# Patient Record
Sex: FEMALE | Race: WHITE | Employment: STUDENT | URBAN - METROPOLITAN AREA
[De-identification: names, ages, dates, MRNs, and addresses within clinical notes are randomized per-mention and may not be internally consistent; named-entity substitution may affect disease eponyms.]

---

## 2023-07-04 ENCOUNTER — OFFICE VISIT (OUTPATIENT)
Dept: FAMILY MEDICINE CLINIC | Facility: CLINIC | Age: 23
End: 2023-07-04
Payer: COMMERCIAL

## 2023-07-04 VITALS
TEMPERATURE: 97 F | SYSTOLIC BLOOD PRESSURE: 106 MMHG | OXYGEN SATURATION: 99 % | WEIGHT: 110 LBS | RESPIRATION RATE: 18 BRPM | BODY MASS INDEX: 18.33 KG/M2 | HEART RATE: 77 BPM | HEIGHT: 65 IN | DIASTOLIC BLOOD PRESSURE: 72 MMHG

## 2023-07-04 DIAGNOSIS — H10.9 BACTERIAL CONJUNCTIVITIS OF LEFT EYE: Primary | ICD-10-CM

## 2023-07-04 PROCEDURE — 3078F DIAST BP <80 MM HG: CPT | Performed by: NURSE PRACTITIONER

## 2023-07-04 PROCEDURE — 99203 OFFICE O/P NEW LOW 30 MIN: CPT | Performed by: NURSE PRACTITIONER

## 2023-07-04 PROCEDURE — 3074F SYST BP LT 130 MM HG: CPT | Performed by: NURSE PRACTITIONER

## 2023-07-04 PROCEDURE — 3008F BODY MASS INDEX DOCD: CPT | Performed by: NURSE PRACTITIONER

## 2023-07-04 RX ORDER — GENTAMICIN SULFATE 3 MG/ML
1 SOLUTION/ DROPS OPHTHALMIC 4 TIMES DAILY
Qty: 1 EACH | Refills: 0 | Status: SHIPPED | OUTPATIENT
Start: 2023-07-04 | End: 2023-07-11

## 2023-07-06 ENCOUNTER — OFFICE VISIT (OUTPATIENT)
Dept: FAMILY MEDICINE CLINIC | Facility: CLINIC | Age: 23
End: 2023-07-06
Payer: COMMERCIAL

## 2023-07-06 VITALS
TEMPERATURE: 99 F | HEART RATE: 91 BPM | OXYGEN SATURATION: 97 % | BODY MASS INDEX: 18.13 KG/M2 | RESPIRATION RATE: 16 BRPM | WEIGHT: 108.81 LBS | HEIGHT: 65 IN | SYSTOLIC BLOOD PRESSURE: 111 MMHG | DIASTOLIC BLOOD PRESSURE: 70 MMHG

## 2023-07-06 DIAGNOSIS — J06.9 VIRAL UPPER RESPIRATORY TRACT INFECTION: ICD-10-CM

## 2023-07-06 DIAGNOSIS — J02.9 SORE THROAT: Primary | ICD-10-CM

## 2023-07-06 LAB
CONTROL LINE PRESENT WITH A CLEAR BACKGROUND (YES/NO): YES YES/NO
KIT LOT #: NORMAL NUMERIC
STREP GRP A CUL-SCR: NEGATIVE

## 2023-07-06 PROCEDURE — 3008F BODY MASS INDEX DOCD: CPT | Performed by: NURSE PRACTITIONER

## 2023-07-06 PROCEDURE — 3078F DIAST BP <80 MM HG: CPT | Performed by: NURSE PRACTITIONER

## 2023-07-06 PROCEDURE — 3074F SYST BP LT 130 MM HG: CPT | Performed by: NURSE PRACTITIONER

## 2023-07-06 PROCEDURE — 87880 STREP A ASSAY W/OPTIC: CPT | Performed by: NURSE PRACTITIONER

## 2023-07-06 PROCEDURE — 99213 OFFICE O/P EST LOW 20 MIN: CPT | Performed by: NURSE PRACTITIONER

## 2023-07-06 PROCEDURE — 87081 CULTURE SCREEN ONLY: CPT | Performed by: NURSE PRACTITIONER

## 2024-04-07 ENCOUNTER — OFFICE VISIT (OUTPATIENT)
Dept: FAMILY MEDICINE CLINIC | Facility: CLINIC | Age: 24
End: 2024-04-07
Payer: COMMERCIAL

## 2024-04-07 VITALS
HEIGHT: 65 IN | RESPIRATION RATE: 16 BRPM | BODY MASS INDEX: 17.49 KG/M2 | TEMPERATURE: 99 F | OXYGEN SATURATION: 99 % | SYSTOLIC BLOOD PRESSURE: 110 MMHG | HEART RATE: 83 BPM | DIASTOLIC BLOOD PRESSURE: 72 MMHG | WEIGHT: 105 LBS

## 2024-04-07 DIAGNOSIS — H10.9 CONJUNCTIVITIS OF LEFT EYE, UNSPECIFIED CONJUNCTIVITIS TYPE: Primary | ICD-10-CM

## 2024-04-07 DIAGNOSIS — J06.9 UPPER RESPIRATORY TRACT INFECTION, UNSPECIFIED TYPE: ICD-10-CM

## 2024-04-07 PROCEDURE — 3078F DIAST BP <80 MM HG: CPT | Performed by: NURSE PRACTITIONER

## 2024-04-07 PROCEDURE — 3074F SYST BP LT 130 MM HG: CPT | Performed by: NURSE PRACTITIONER

## 2024-04-07 PROCEDURE — 99213 OFFICE O/P EST LOW 20 MIN: CPT | Performed by: NURSE PRACTITIONER

## 2024-04-07 PROCEDURE — 3008F BODY MASS INDEX DOCD: CPT | Performed by: NURSE PRACTITIONER

## 2024-04-07 RX ORDER — OFLOXACIN 3 MG/ML
2 SOLUTION/ DROPS OPHTHALMIC 4 TIMES DAILY
Qty: 1 EACH | Refills: 0 | Status: SHIPPED | OUTPATIENT
Start: 2024-04-07 | End: 2024-04-14

## 2024-04-07 RX ORDER — OFLOXACIN 3 MG/ML
2 SOLUTION/ DROPS OPHTHALMIC 4 TIMES DAILY
Qty: 1 EACH | Refills: 0 | Status: SHIPPED | OUTPATIENT
Start: 2024-04-07 | End: 2024-04-07

## 2024-04-07 NOTE — PROGRESS NOTES
CHIEF COMPLAINT:     Chief Complaint   Patient presents with    Eye Problem     Red gooey       HPI:   Ruba Saleh is a 23 year old female who presents for upper respiratory symptoms for  2 days. Patient reports congestion, dry cough, left eye red and draining . Symptoms have been worsening since onset.  Treating symptoms with dayquil/nyquil. 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      Current Outpatient Medications   Medication Sig Dispense Refill    ofloxacin 0.3 % Ophthalmic Solution Place 2 drops into the left eye 4 (four) times daily for 7 days. 1 each 0    Levonorgestrel-Ethinyl Estrad (ALTAVERA) 0.15-30 MG-MCG Oral Tab TAKE 1 TABLET BY MOUTH EVERY DAY CONTINUOUSLY 112 tablet 3      Past Medical History:   Diagnosis Date    Allergic rhinitis, seasonal 5/8/2014      History reviewed. No pertinent surgical history.      Social History     Socioeconomic History    Marital status: Single   Tobacco Use    Smoking status: Never    Smokeless tobacco: Never   Vaping Use    Vaping Use: Never used   Substance and Sexual Activity    Alcohol use: Yes     Comment: soc    Drug use: No    Sexual activity: Yes     Partners: Male     Birth control/protection: Condom, Pill, OCP         REVIEW OF SYSTEMS:   GENERAL: normal appetite  SKIN: no rashes or abnormal skin lesions  HEENT: See HPI  LUNGS: See HPI  CARDIOVASCULAR: denies chest pain or palpitations   GI: denies N/V/C or abdominal pain      EXAM:   /72   Pulse 83   Temp 98.6 °F (37 °C)   Resp 16   Ht 5' 5\" (1.651 m)   Wt 105 lb (47.6 kg)   LMP 05/04/2023 (Approximate)   SpO2 99%   BMI 17.47 kg/m²   GENERAL: well developed, well nourished,in no apparent distress  SKIN: no rashes,no suspicious lesions  HEAD: atraumatic, normocephalic.  no tenderness on palpation of  sinuses  EYES: conjunctiva clear on right, injected with yellow/white crusted drainage, EOM intact  EARS: TM's pearly, no bulging, no retraction,no fluid, bony landmarks visible  NOSE: Nostrils patent,  clear nasal discharge, nasal mucosa red and inflamed   THROAT: Oral mucosa pink, moist. Posterior pharynx is not erythematous. no exudates. Tonsils 1/4.    NECK: Supple, non-tender  LUNGS: clear to auscultation bilaterally, no wheezes or rhonchi. Breathing is non labored.  CARDIO: RRR without murmur  EXTREMITIES: no cyanosis, clubbing or edema  LYMPH:  pos anterior cervical lymphadenopathy.        ASSESSMENT AND PLAN:   Ruba Saleh is a 23 year old female who presents with upper respiratory symptoms that are consistent with    ASSESSMENT:   Encounter Diagnoses   Name Primary?    Conjunctivitis of left eye, unspecified conjunctivitis type Yes    Upper respiratory tract infection, unspecified type        PLAN: Meds as below.  Comfort care as described in Patient Instructions  Educated on medication for eye  Educated on viral vs bacterial  Educated on supportive measures: ibuprofen/tylenol, hydration, zyrtec, flonase, delsym for cough if needed  Educated on s/s of worsening sx and when to seek higher level of care  Follow up with pcp if not improving    Meds & Refills for this Visit:  Requested Prescriptions     Signed Prescriptions Disp Refills    ofloxacin 0.3 % Ophthalmic Solution 1 each 0     Sig: Place 2 drops into the left eye 4 (four) times daily for 7 days.     Risks, benefits, and side effects of medication explained and discussed.    The patient indicates understanding of these issues and agrees to the plan.  The patient is asked to f/u with PCP if sx's persist or worsen.  There are no Patient Instructions on file for this visit.

## 2024-04-12 ENCOUNTER — OFFICE VISIT (OUTPATIENT)
Dept: FAMILY MEDICINE CLINIC | Facility: CLINIC | Age: 24
End: 2024-04-12
Payer: COMMERCIAL

## 2024-04-12 VITALS
SYSTOLIC BLOOD PRESSURE: 129 MMHG | RESPIRATION RATE: 18 BRPM | WEIGHT: 105 LBS | HEART RATE: 106 BPM | BODY MASS INDEX: 17.49 KG/M2 | TEMPERATURE: 100 F | HEIGHT: 65 IN | OXYGEN SATURATION: 95 % | DIASTOLIC BLOOD PRESSURE: 87 MMHG

## 2024-04-12 DIAGNOSIS — J01.00 ACUTE NON-RECURRENT MAXILLARY SINUSITIS: Primary | ICD-10-CM

## 2024-04-12 PROCEDURE — 99213 OFFICE O/P EST LOW 20 MIN: CPT | Performed by: NURSE PRACTITIONER

## 2024-04-12 PROCEDURE — 3008F BODY MASS INDEX DOCD: CPT | Performed by: NURSE PRACTITIONER

## 2024-04-12 PROCEDURE — 3079F DIAST BP 80-89 MM HG: CPT | Performed by: NURSE PRACTITIONER

## 2024-04-12 PROCEDURE — 3074F SYST BP LT 130 MM HG: CPT | Performed by: NURSE PRACTITIONER

## 2024-04-12 RX ORDER — AMOXICILLIN AND CLAVULANATE POTASSIUM 875; 125 MG/1; MG/1
1 TABLET, FILM COATED ORAL 2 TIMES DAILY
Qty: 14 TABLET | Refills: 0 | Status: SHIPPED | OUTPATIENT
Start: 2024-04-12 | End: 2024-04-19

## 2024-04-13 NOTE — PATIENT INSTRUCTIONS
I recommend the 4 H's for inflammation:    1. Heat (warm mist from the shower or warm liquids such as tea)  2. Honey (mixed in your tea or by the spoonful [if you are not diabetic; over the age of 1 year]--take a spoonful 3 times a day and don't eat or drink anything for 15-20 minutes)  3. Humidity--cool mist in the bedroom at night  4. Hydration --at least 8 -10 glasses a day     Flonase daily  Benadryl at night  Start antibiotic in a few days for worsening symptoms or no improvement.

## 2024-04-13 NOTE — PROGRESS NOTES
CHIEF COMPLAINT:     Chief Complaint   Patient presents with    Sinus Problem     Symptoms started on Sunday : sinus pressure, headache, and congestion  OTC: flonase and dayquill        HPI:   Ruba Saleh is a 23 year old female who presents for cold symptoms for  6  days. Symptoms have progressed into sinus congestion and been worsening since onset. Sinus congestion/pain is described as a pressure and is located mainly in maxillary sinuses.  Patient also reports congestion, sinus pain. reports dental pain. Has treated symptoms with Flonase and Dayquil.       Current Outpatient Medications   Medication Sig Dispense Refill    amoxicillin clavulanate 875-125 MG Oral Tab Take 1 tablet by mouth 2 (two) times daily for 7 days. 14 tablet 0    ofloxacin 0.3 % Ophthalmic Solution Place 2 drops into the left eye 4 (four) times daily for 7 days. 1 each 0    Levonorgestrel-Ethinyl Estrad (ALTAVERA) 0.15-30 MG-MCG Oral Tab TAKE 1 TABLET BY MOUTH EVERY DAY CONTINUOUSLY 112 tablet 3      Past Medical History:    Allergic rhinitis, seasonal      No past surgical history on file.   Family History   Problem Relation Age of Onset    Arthritis Maternal Grandmother     Diabetes Maternal Grandmother     Mental Disorder Maternal Grandmother         bipolar    Heart Disease Maternal Grandfather         atrial fib.    High Blood Pressure Maternal Grandfather     High Cholesterol Maternal Grandfather     High Blood Pressure Paternal Grandmother     High Cholesterol Paternal Grandmother     Cancer Paternal Grandfather         Liver cancer    Other (Other) Brother         kidney stone      Social History     Socioeconomic History    Marital status: Single   Tobacco Use    Smoking status: Never    Smokeless tobacco: Never   Vaping Use    Vaping status: Never Used   Substance and Sexual Activity    Alcohol use: Yes     Comment: soc    Drug use: No    Sexual activity: Yes     Partners: Male     Birth control/protection: Condom, Pill, OCP          REVIEW OF SYSTEMS:   GENERAL:  denies  diminished appetite  SKIN: no rashes or abnormal skin lesions  HEENT: See HPI.    LUNGS: denies shortness of breath or wheezing, See HPI  CARDIOVASCULAR: denies chest pain or palpitations   GI: denies N/V/C or abdominal pain  NEURO: + sinus headaches.  No numbness or tingling in face.    EXAM:   /87   Pulse 106   Temp 99.9 °F (37.7 °C)   Resp 18   Ht 5' 5\" (1.651 m)   Wt 105 lb (47.6 kg)   LMP 02/28/2024 (Approximate)   SpO2 95%   BMI 17.47 kg/m²   GENERAL: well developed, well nourished,in no apparent distress  SKIN: no rashes,no suspicious lesions  HEAD: atraumatic, normocephalic, + tenderness on palpation of maxillary sinuses  EYES: conjunctiva clear, EOM intact  EARS: TM's without erythema, no bulging, no retraction, mild fluid, bony landmarks visible  NOSE: nostrils patent, clear nasal mucous, nasal mucosa reddened and inflamed  THROAT: oral mucosa pink, moist. No visible dental caries. Posterior pharynx is not erythematous.  NECK: supple, non-tender  LUNGS: Breathing is non labored. Lungs clear to auscultation bilaterally, no wheezes or rhonchi.   CARDIO: RRR without murmur  EXTREMITIES: no cyanosis, clubbing or edema  LYMPH:  no cervical lymphadenopathy.        ASSESSMENT AND PLAN:   Ruba Saleh is a 23 year old female who presents with URI symptoms that are consistent with:      ASSESSMENT:  Encounter Diagnosis   Name Primary?    Acute non-recurrent maxillary sinusitis Yes         PLAN: Meds as below.  Patient to start in a few days for new or worsening symptoms.  Comfort care instructions as listed in Patient Instructions    Meds & Refills for this Visit:  Requested Prescriptions     Signed Prescriptions Disp Refills    amoxicillin clavulanate 875-125 MG Oral Tab 14 tablet 0     Sig: Take 1 tablet by mouth 2 (two) times daily for 7 days.       Risks, benefits, side effects of medication addressed and explained.    Patient Instructions   I recommend  the 4 H's for inflammation:    1. Heat (warm mist from the shower or warm liquids such as tea)  2. Honey (mixed in your tea or by the spoonful [if you are not diabetic; over the age of 1 year]--take a spoonful 3 times a day and don't eat or drink anything for 15-20 minutes)  3. Humidity--cool mist in the bedroom at night  4. Hydration --at least 8 -10 glasses a day     Flonase daily  Benadryl at night  Start antibiotic in a few days for worsening symptoms or no improvement.      The patient indicates understanding of these issues and agrees to the plan.  The patient is asked to f/u with PCP if sx's persist or worsen.

## 2024-08-27 ENCOUNTER — OFFICE VISIT (OUTPATIENT)
Dept: FAMILY MEDICINE CLINIC | Facility: CLINIC | Age: 24
End: 2024-08-27
Payer: COMMERCIAL

## 2024-08-27 VITALS
SYSTOLIC BLOOD PRESSURE: 112 MMHG | HEIGHT: 65 IN | HEART RATE: 85 BPM | DIASTOLIC BLOOD PRESSURE: 70 MMHG | TEMPERATURE: 99 F | WEIGHT: 108 LBS | OXYGEN SATURATION: 98 % | BODY MASS INDEX: 17.99 KG/M2 | RESPIRATION RATE: 16 BRPM

## 2024-08-27 DIAGNOSIS — L71.0 PERIORAL DERMATITIS: Primary | ICD-10-CM

## 2024-08-27 PROCEDURE — 3074F SYST BP LT 130 MM HG: CPT | Performed by: NURSE PRACTITIONER

## 2024-08-27 PROCEDURE — 3078F DIAST BP <80 MM HG: CPT | Performed by: NURSE PRACTITIONER

## 2024-08-27 PROCEDURE — 3008F BODY MASS INDEX DOCD: CPT | Performed by: NURSE PRACTITIONER

## 2024-08-27 PROCEDURE — 99213 OFFICE O/P EST LOW 20 MIN: CPT | Performed by: NURSE PRACTITIONER

## 2024-08-27 RX ORDER — ERYTHROMYCIN 20 MG/G
1 GEL TOPICAL 2 TIMES DAILY
Qty: 30 G | Refills: 0 | Status: SHIPPED | OUTPATIENT
Start: 2024-08-27

## 2024-08-27 NOTE — PROGRESS NOTES
CHIEF COMPLAINT:     Chief Complaint   Patient presents with    Rash     2 months, spot on right side of chin, gotten bigger, started as one small bump and slowly expanded  OTC neosporin          HPI:    Ruba Saleh is a 23 year old female accompanied by mother who presents for evaluation of a rash.  Per patient rash started in the past 2 months on right side of chin. Has increased in size since onset. The area is not painful or pruritic. No crusting or drainage. Treated with topical neosporin a few times without improvement.       Current Outpatient Medications   Medication Sig Dispense Refill    Erythromycin 2 % External Gel Apply 1 Application topically 2 (two) times daily. 30 g 0    Levonorgestrel-Ethinyl Estrad (ALTAVERA) 0.15-30 MG-MCG Oral Tab TAKE 1 TABLET BY MOUTH EVERY DAY CONTINUOUSLY 112 tablet 3     No current facility-administered medications for this visit.      Past Medical History:    Allergic rhinitis, seasonal      No past surgical history on file.   Family History   Problem Relation Age of Onset    Arthritis Maternal Grandmother     Diabetes Maternal Grandmother     Mental Disorder Maternal Grandmother         bipolar    Heart Disease Maternal Grandfather         atrial fib.    High Blood Pressure Maternal Grandfather     High Cholesterol Maternal Grandfather     High Blood Pressure Paternal Grandmother     High Cholesterol Paternal Grandmother     Cancer Paternal Grandfather         Liver cancer    Other (Other) Brother         kidney stone      Social History     Socioeconomic History    Marital status: Single   Tobacco Use    Smoking status: Never    Smokeless tobacco: Never   Vaping Use    Vaping status: Never Used   Substance and Sexual Activity    Alcohol use: Yes     Comment: soc    Drug use: No    Sexual activity: Yes     Partners: Male     Birth control/protection: Condom, Pill, OCP         REVIEW OF SYSTEMS:   GENERAL: feels well otherwise, no fever, no chills.  SKIN: Per HPI. No  edema. No ulcerations.  HEENT: Denies rhinorrhea, edema of the lips or swelling of throat.  CARDIOVASCULAR: Denies chest pains or palpitations.  LUNGS: Denies shortness of breath with exertion or rest. No cough or wheezing.  LYMPH: Denies enlargement of the lymph nodes.  NEURO: Denies abnormal sensation, tingling of the skin, or numbness.      EXAM:   /70   Pulse 85   Temp 99.4 °F (37.4 °C)   Resp 16   Ht 5' 5\" (1.651 m)   Wt 108 lb (49 kg)   LMP 08/19/2024 (Approximate)   SpO2 98%   BMI 17.97 kg/m²   GENERAL: well developed, well nourished,in no apparent distress  SKIN: patch of fine erythematous papules on chin near right corner of mouth. No crusting, vesicles, or drainage  EYES: PERRLA, EOMI, conjunctivae are clear  HENT: Head atraumatic, normocephalic.   LUNGS: Clear to auscultation bilaterally.    CARDIO: RRR without murmur      ASSESSMENT AND PLAN:   Ruba Saleh is a 23 year old female who presents for evaluation of a rash. Findings are consistent with:    ASSESSMENT:  Encounter Diagnosis   Name Primary?    Perioral dermatitis Yes       PLAN:   Possible perioral dermatitis. Meds as listed below. Advised follow up with dermatology if not improving the next few weeks. To seek sooner follow up for new or worsening symptoms. Pt states she has dermatology appt scheduled.     Meds & Refills for this Visit:  Requested Prescriptions     Signed Prescriptions Disp Refills    Erythromycin 2 % External Gel 30 g 0     Sig: Apply 1 Application topically 2 (two) times daily.         Risk and benefits of medication discussed.     The patient indicates understanding of these issues and agrees to the plan.  The patient is asked to return in 3 days if sx persist or worsen